# Patient Record
Sex: FEMALE | Race: WHITE | NOT HISPANIC OR LATINO | URBAN - METROPOLITAN AREA
[De-identification: names, ages, dates, MRNs, and addresses within clinical notes are randomized per-mention and may not be internally consistent; named-entity substitution may affect disease eponyms.]

---
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
## 2022-01-12 ENCOUNTER — OFFICE VISIT (OUTPATIENT)
Dept: URBAN - METROPOLITAN AREA CLINIC 70 | Facility: CLINIC | Age: 22
End: 2022-01-12

## 2022-01-18 ENCOUNTER — OFFICE VISIT (OUTPATIENT)
Dept: URBAN - METROPOLITAN AREA CLINIC 70 | Facility: CLINIC | Age: 22
End: 2022-01-18

## 2022-01-18 RX ORDER — POLYETHYLENE GLYCOL 3350 17 G/17G
AS DIRECTED POWDER, FOR SOLUTION ORAL
Status: ACTIVE | COMMUNITY
Start: 2021-11-24

## 2022-01-18 RX ORDER — PANTOPRAZOLE SODIUM 40 MG/1
1 TABLET TABLET, DELAYED RELEASE ORAL ONCE A DAY
Qty: 90 | Refills: 3 | Status: ACTIVE | COMMUNITY
Start: 2021-11-24

## 2022-01-18 NOTE — HPI-TODAY'S VISIT:
Note from OV 11/24/21: Patient was referred by Severo Milan for an evaluation of abdominal pain. A copy of this note will be sent to the referring provider. Pt presents complaining of constipation and GERD.  Constipation has been present since the patient was a young teenager.  She states she has a bowel movement every 5-6 days.  On a few occasions, she has gone close to 2 weeks without a bowel movement.  Pt states that when she finally does have a bowel movement she experiences diarrhea without feelings of complete evacuation.  Associated symptoms include abdominal pain, bloating, and nausea.  She was seen at urgent care on 10/6/2021 for abdominal pain and was given a prescription for bentyl for 10 days.  Pt denies taking the medication.  She denies ever taking anything for constipation.   She had a colonoscopy in 2018 for abdominal pain.  Pt denies history of polyps or family history of colon cancer.   She denies weight loss or rectal bleeding.  The patient also presents complaining of GERD.  Symptoms include a burning sensation in her chest, belching, nausea, occasional vomiting, cough, and sore throat.  Symptoms have been present for a few years.  The pt has never taken any medication for her symptoms.  Pt states she often eats spicy foods because "it numbs the pain." Denies use of NSAIDS, dysphagia, or sputum with blood.  Pt states she had an EGD in 2018 showing gastritis. ---------------------------------- TODAY 1/18/22:

## 2022-01-20 ENCOUNTER — OFFICE VISIT (OUTPATIENT)
Dept: URBAN - METROPOLITAN AREA CLINIC 70 | Facility: CLINIC | Age: 22
End: 2022-01-20

## 2022-01-24 ENCOUNTER — OFFICE VISIT (OUTPATIENT)
Dept: URBAN - METROPOLITAN AREA CLINIC 70 | Facility: CLINIC | Age: 22
End: 2022-01-24